# Patient Record
Sex: MALE | Race: WHITE | NOT HISPANIC OR LATINO | URBAN - METROPOLITAN AREA
[De-identification: names, ages, dates, MRNs, and addresses within clinical notes are randomized per-mention and may not be internally consistent; named-entity substitution may affect disease eponyms.]

---

## 2023-10-02 ENCOUNTER — WEB ENCOUNTER (OUTPATIENT)
Dept: URBAN - METROPOLITAN AREA CLINIC 92 | Facility: CLINIC | Age: 48
End: 2023-10-02

## 2023-10-02 ENCOUNTER — LAB OUTSIDE AN ENCOUNTER (OUTPATIENT)
Dept: URBAN - METROPOLITAN AREA CLINIC 92 | Facility: CLINIC | Age: 48
End: 2023-10-02

## 2023-10-02 ENCOUNTER — OFFICE VISIT (OUTPATIENT)
Dept: URBAN - METROPOLITAN AREA CLINIC 92 | Facility: CLINIC | Age: 48
End: 2023-10-02
Payer: COMMERCIAL

## 2023-10-02 ENCOUNTER — DASHBOARD ENCOUNTERS (OUTPATIENT)
Age: 48
End: 2023-10-02

## 2023-10-02 VITALS
HEART RATE: 97 BPM | TEMPERATURE: 97.2 F | HEIGHT: 74 IN | SYSTOLIC BLOOD PRESSURE: 122 MMHG | BODY MASS INDEX: 26.18 KG/M2 | DIASTOLIC BLOOD PRESSURE: 87 MMHG | WEIGHT: 204 LBS

## 2023-10-02 DIAGNOSIS — Z86.010 PERSONAL HISTORY OF COLONIC POLYPS: ICD-10-CM

## 2023-10-02 PROBLEM — 428283002: Status: ACTIVE | Noted: 2023-10-02

## 2023-10-02 PROCEDURE — 99203 OFFICE O/P NEW LOW 30 MIN: CPT | Performed by: INTERNAL MEDICINE

## 2023-10-02 RX ORDER — TRETINOIN 0.5 MG/G
APPLY 1 A SMALL AMOUNT TO SKIN ONCE A DAY CREAM TOPICAL
Qty: 20 GRAM | Refills: 0 | Status: ACTIVE | COMMUNITY

## 2023-10-02 RX ORDER — ONDANSETRON 4 MG/1
2 TABLETS TABLET, FILM COATED ORAL
Qty: 2 TABLETS | Refills: 0 | OUTPATIENT
Start: 2023-10-02

## 2023-10-02 RX ORDER — SODIUM, POTASSIUM,MAG SULFATES 17.5-3.13G
354 ML SOLUTION, RECONSTITUTED, ORAL ORAL AS DIRECTED
Qty: 354 ML | Refills: 0 | OUTPATIENT
Start: 2023-10-02 | End: 2023-10-03

## 2023-10-02 RX ORDER — ALPRAZOLAM 0.5 MG/1
TAKE HALF A TABLET TO A FULL TABLET AT BEDTIME AS NEEDED FOR INSOMNIA TABLET ORAL
Qty: 5 EACH | Refills: 0 | Status: ACTIVE | COMMUNITY

## 2023-10-02 RX ORDER — ATORVASTATIN CALCIUM, FILM COATED 20 MG/1
TABLET ORAL
Qty: 30 TABLET | Status: ACTIVE | COMMUNITY

## 2023-10-02 RX ORDER — DISULFIRAM 250 MG/1
TAKE 1 TABLET DAILY TABLET ORAL
Qty: 30 EACH | Refills: 0 | Status: ACTIVE | COMMUNITY

## 2023-10-02 RX ORDER — TRAZODONE HYDROCHLORIDE 50 MG/1
TAKE 1 TABLET DAILY TABLET ORAL
Qty: 15 EACH | Refills: 0 | Status: ACTIVE | COMMUNITY

## 2023-10-02 RX ORDER — VORTIOXETINE 20 MG/1
TAKE 1 TABLET DAILY TABLET, FILM COATED ORAL
Qty: 90 EACH | Refills: 0 | Status: ACTIVE | COMMUNITY

## 2023-10-02 NOTE — HPI-TODAY'S VISIT:
This is a 48-year-old male referred for GI consultation by Dr. Obregon and a copy of this note will be sent to the referring provider.  Patient had a directly scheduled colonoscopy on September 26, 2017 this revealed internal hemorrhoids and a redundant colon there was a transverse colon diverticulum seen.  The prep was fair with some liquid scattered throughout the colon I recommended repeat exam in 5 years.  Prior to this he had a colonoscopy on April 2, 2014 for rectal bleeding a 1.2 cm pedunculated polyp was removed from the descending colon with a hot snare and a 3 mm cecal polyp was also removed.  Pathology showed tubular adenomas. Pt goes to bathroom on a regular basis Pt denies cardiopulm issues. Pt stopped drinking in 2018. Pt is  and has 2 kids. Wife is a consultant.

## 2024-01-23 ENCOUNTER — OFFICE VISIT (OUTPATIENT)
Dept: URBAN - METROPOLITAN AREA SURGERY CENTER 16 | Facility: SURGERY CENTER | Age: 49
End: 2024-01-23

## 2024-05-16 ENCOUNTER — OFFICE VISIT (OUTPATIENT)
Dept: URBAN - METROPOLITAN AREA SURGERY CENTER 16 | Facility: SURGERY CENTER | Age: 49
End: 2024-05-16

## 2025-02-20 ENCOUNTER — OFFICE VISIT (OUTPATIENT)
Dept: URBAN - METROPOLITAN AREA CLINIC 124 | Facility: CLINIC | Age: 50
End: 2025-02-20